# Patient Record
Sex: FEMALE | Race: WHITE | Employment: OTHER | URBAN - METROPOLITAN AREA
[De-identification: names, ages, dates, MRNs, and addresses within clinical notes are randomized per-mention and may not be internally consistent; named-entity substitution may affect disease eponyms.]

---

## 2021-02-08 RX ORDER — SOTALOL HYDROCHLORIDE 160 MG/1
TABLET ORAL
COMMUNITY

## 2021-02-08 RX ORDER — DILTIAZEM HYDROCHLORIDE 120 MG/1
120 CAPSULE, EXTENDED RELEASE ORAL DAILY
COMMUNITY

## 2021-02-23 ENCOUNTER — HOSPITAL ENCOUNTER (EMERGENCY)
Age: 86
Discharge: STILL ADMITTED OR ADMINISTRATIVE | End: 2021-02-23

## 2021-02-23 ENCOUNTER — HOSPITAL ENCOUNTER (OUTPATIENT)
Age: 86
Discharge: ANOTHER HOSPITAL | End: 2021-02-23
Attending: INTERNAL MEDICINE | Admitting: INTERNAL MEDICINE
Payer: MEDICARE

## 2021-02-23 VITALS
HEART RATE: 71 BPM | BODY MASS INDEX: 19.63 KG/M2 | DIASTOLIC BLOOD PRESSURE: 72 MMHG | OXYGEN SATURATION: 100 % | SYSTOLIC BLOOD PRESSURE: 111 MMHG | HEIGHT: 64 IN | WEIGHT: 115 LBS | TEMPERATURE: 97.9 F | RESPIRATION RATE: 19 BRPM

## 2021-02-23 DIAGNOSIS — R07.9 CHEST PAIN, UNSPECIFIED TYPE: ICD-10-CM

## 2021-02-23 DIAGNOSIS — R06.02 SOB (SHORTNESS OF BREATH): ICD-10-CM

## 2021-02-23 LAB
ALBUMIN SERPL-MCNC: 3.1 G/DL (ref 3.5–5)
ALBUMIN/GLOB SERPL: 0.7 {RATIO} (ref 1.1–2.2)
ALP SERPL-CCNC: 108 U/L (ref 45–117)
ALT SERPL-CCNC: 14 U/L (ref 12–78)
ANION GAP SERPL CALC-SCNC: 5 MMOL/L (ref 5–15)
APTT PPP: 27.8 SEC (ref 23–35.7)
AST SERPL W P-5'-P-CCNC: 20 U/L (ref 15–37)
ATRIAL RATE: 54 BPM
BILIRUB SERPL-MCNC: 0.5 MG/DL (ref 0.2–1)
BUN SERPL-MCNC: 17 MG/DL (ref 6–20)
BUN/CREAT SERPL: 17 (ref 12–20)
CA-I BLD-MCNC: 9.6 MG/DL (ref 8.5–10.1)
CALCULATED P AXIS, ECG09: 84 DEGREES
CALCULATED R AXIS, ECG10: -52 DEGREES
CALCULATED T AXIS, ECG11: 62 DEGREES
CHLORIDE SERPL-SCNC: 98 MMOL/L (ref 97–108)
CO2 SERPL-SCNC: 33 MMOL/L (ref 21–32)
CREAT SERPL-MCNC: 0.98 MG/DL (ref 0.55–1.02)
DIAGNOSIS, 93000: NORMAL
ERYTHROCYTE [DISTWIDTH] IN BLOOD BY AUTOMATED COUNT: 14.8 % (ref 11.5–14.5)
GLOBULIN SER CALC-MCNC: 4.5 G/DL (ref 2–4)
GLUCOSE BLD STRIP.AUTO-MCNC: 104 MG/DL (ref 65–100)
GLUCOSE SERPL-MCNC: 95 MG/DL (ref 65–100)
HCT VFR BLD AUTO: 43.6 % (ref 35–47)
HGB BLD-MCNC: 13.4 G/DL (ref 11.5–16)
INR PPP: 1 (ref 0.9–1.1)
MCH RBC QN AUTO: 28.3 PG (ref 26–34)
MCHC RBC AUTO-ENTMCNC: 30.7 G/DL (ref 30–36.5)
MCV RBC AUTO: 92.2 FL (ref 80–99)
P-R INTERVAL, ECG05: 194 MS
PERFORMED BY, TECHID: ABNORMAL
PLATELET # BLD AUTO: 296 K/UL (ref 150–400)
PMV BLD AUTO: 10.1 FL (ref 8.9–12.9)
POTASSIUM SERPL-SCNC: 4 MMOL/L (ref 3.5–5.1)
PROT SERPL-MCNC: 7.6 G/DL (ref 6.4–8.2)
PROTHROMBIN TIME: 13.5 SEC (ref 11.9–14.7)
Q-T INTERVAL, ECG07: 482 MS
QRS DURATION, ECG06: 76 MS
QTC CALCULATION (BEZET), ECG08: 457 MS
RBC # BLD AUTO: 4.73 M/UL (ref 3.8–5.2)
SODIUM SERPL-SCNC: 136 MMOL/L (ref 136–145)
THERAPEUTIC RANGE,PTTT: NORMAL SEC (ref 68–109)
VENTRICULAR RATE, ECG03: 54 BPM
WBC # BLD AUTO: 11 K/UL (ref 3.6–11)

## 2021-02-23 PROCEDURE — 77030019698 HC SYR ANGI MDLON MRTM -A: Performed by: INTERNAL MEDICINE

## 2021-02-23 PROCEDURE — 93571 IV DOP VEL&/PRESS C FLO 1ST: CPT | Performed by: INTERNAL MEDICINE

## 2021-02-23 PROCEDURE — 93010 ELECTROCARDIOGRAM REPORT: CPT | Performed by: INTERNAL MEDICINE

## 2021-02-23 PROCEDURE — 76210000005 HC OR PH I REC 5 TO 5.5 HR: Performed by: INTERNAL MEDICINE

## 2021-02-23 PROCEDURE — 2709999900 HC NON-CHARGEABLE SUPPLY: Performed by: INTERNAL MEDICINE

## 2021-02-23 PROCEDURE — 74011000250 HC RX REV CODE- 250: Performed by: INTERNAL MEDICINE

## 2021-02-23 PROCEDURE — 74011250636 HC RX REV CODE- 250/636: Performed by: INTERNAL MEDICINE

## 2021-02-23 PROCEDURE — C1894 INTRO/SHEATH, NON-LASER: HCPCS | Performed by: INTERNAL MEDICINE

## 2021-02-23 PROCEDURE — C1769 GUIDE WIRE: HCPCS | Performed by: INTERNAL MEDICINE

## 2021-02-23 PROCEDURE — 99152 MOD SED SAME PHYS/QHP 5/>YRS: CPT | Performed by: INTERNAL MEDICINE

## 2021-02-23 PROCEDURE — 74011250637 HC RX REV CODE- 250/637: Performed by: INTERNAL MEDICINE

## 2021-02-23 PROCEDURE — C1887 CATHETER, GUIDING: HCPCS | Performed by: INTERNAL MEDICINE

## 2021-02-23 PROCEDURE — 82962 GLUCOSE BLOOD TEST: CPT

## 2021-02-23 PROCEDURE — 85610 PROTHROMBIN TIME: CPT

## 2021-02-23 PROCEDURE — 93458 L HRT ARTERY/VENTRICLE ANGIO: CPT | Performed by: INTERNAL MEDICINE

## 2021-02-23 PROCEDURE — 85730 THROMBOPLASTIN TIME PARTIAL: CPT

## 2021-02-23 PROCEDURE — 92978 ENDOLUMINL IVUS OCT C 1ST: CPT | Performed by: INTERNAL MEDICINE

## 2021-02-23 PROCEDURE — 80053 COMPREHEN METABOLIC PANEL: CPT

## 2021-02-23 PROCEDURE — 74011000636 HC RX REV CODE- 636: Performed by: INTERNAL MEDICINE

## 2021-02-23 PROCEDURE — 77030008542 HC TBNG MON PRSS EDWD -A: Performed by: INTERNAL MEDICINE

## 2021-02-23 PROCEDURE — 85027 COMPLETE CBC AUTOMATED: CPT

## 2021-02-23 PROCEDURE — 77030016699 HC CATH ANGI DX INFN1 CARD -A: Performed by: INTERNAL MEDICINE

## 2021-02-23 PROCEDURE — 77030003394 HC NDL ART COOK -A: Performed by: INTERNAL MEDICINE

## 2021-02-23 PROCEDURE — 77030012468 HC VLV BLEEDBK CNTRL ABBT -B: Performed by: INTERNAL MEDICINE

## 2021-02-23 PROCEDURE — 36415 COLL VENOUS BLD VENIPUNCTURE: CPT

## 2021-02-23 PROCEDURE — C1753 CATH, INTRAVAS ULTRASOUND: HCPCS | Performed by: INTERNAL MEDICINE

## 2021-02-23 PROCEDURE — 77030029065 HC DRSG HEMO QCLOT ZMED -B: Performed by: INTERNAL MEDICINE

## 2021-02-23 PROCEDURE — 77030042317 HC BND COMPR HEMSTAT -B: Performed by: INTERNAL MEDICINE

## 2021-02-23 PROCEDURE — 99153 MOD SED SAME PHYS/QHP EA: CPT | Performed by: INTERNAL MEDICINE

## 2021-02-23 PROCEDURE — 93005 ELECTROCARDIOGRAM TRACING: CPT

## 2021-02-23 RX ORDER — FENTANYL CITRATE 50 UG/ML
INJECTION, SOLUTION INTRAMUSCULAR; INTRAVENOUS AS NEEDED
Status: DISCONTINUED | OUTPATIENT
Start: 2021-02-23 | End: 2021-02-23 | Stop reason: HOSPADM

## 2021-02-23 RX ORDER — MIDAZOLAM HYDROCHLORIDE 1 MG/ML
INJECTION, SOLUTION INTRAMUSCULAR; INTRAVENOUS AS NEEDED
Status: DISCONTINUED | OUTPATIENT
Start: 2021-02-23 | End: 2021-02-23 | Stop reason: HOSPADM

## 2021-02-23 RX ORDER — HEPARIN SODIUM 1000 [USP'U]/ML
INJECTION, SOLUTION INTRAVENOUS; SUBCUTANEOUS AS NEEDED
Status: DISCONTINUED | OUTPATIENT
Start: 2021-02-23 | End: 2021-02-23 | Stop reason: HOSPADM

## 2021-02-23 RX ORDER — HEPARIN SODIUM 200 [USP'U]/100ML
INJECTION, SOLUTION INTRAVENOUS
Status: COMPLETED | OUTPATIENT
Start: 2021-02-23 | End: 2021-02-23

## 2021-02-23 RX ORDER — SODIUM CHLORIDE 0.9 % (FLUSH) 0.9 %
5-40 SYRINGE (ML) INJECTION AS NEEDED
Status: CANCELLED | OUTPATIENT
Start: 2021-02-23

## 2021-02-23 RX ORDER — NICARDIPINE HYDROCHLORIDE 2.5 MG/ML
INJECTION INTRAVENOUS AS NEEDED
Status: DISCONTINUED | OUTPATIENT
Start: 2021-02-23 | End: 2021-02-23 | Stop reason: HOSPADM

## 2021-02-23 RX ORDER — LIDOCAINE HYDROCHLORIDE 10 MG/ML
INJECTION INFILTRATION; PERINEURAL AS NEEDED
Status: DISCONTINUED | OUTPATIENT
Start: 2021-02-23 | End: 2021-02-23 | Stop reason: HOSPADM

## 2021-02-23 RX ORDER — GUAIFENESIN 100 MG/5ML
LIQUID (ML) ORAL AS NEEDED
Status: DISCONTINUED | OUTPATIENT
Start: 2021-02-23 | End: 2021-02-23 | Stop reason: HOSPADM

## 2021-02-23 RX ORDER — SODIUM CHLORIDE 9 MG/ML
50 INJECTION, SOLUTION INTRAVENOUS CONTINUOUS
Status: DISCONTINUED | OUTPATIENT
Start: 2021-02-23 | End: 2021-02-24 | Stop reason: HOSPADM

## 2021-02-23 RX ORDER — SODIUM CHLORIDE 0.9 % (FLUSH) 0.9 %
5-40 SYRINGE (ML) INJECTION EVERY 8 HOURS
Status: CANCELLED | OUTPATIENT
Start: 2021-02-23

## 2021-02-23 RX ORDER — GUAIFENESIN 100 MG/5ML
81 LIQUID (ML) ORAL DAILY
Status: DISCONTINUED | OUTPATIENT
Start: 2021-02-24 | End: 2021-02-24 | Stop reason: HOSPADM

## 2021-02-23 RX ADMIN — SODIUM CHLORIDE 50 ML/HR: 9 INJECTION, SOLUTION INTRAVENOUS at 13:59

## 2021-02-23 NOTE — Clinical Note
TRANSFER - OUT REPORT:     Verbal report given to: Joseph. Report consisted of patient's Situation, Background, Assessment and   Recommendations(SBAR). Opportunity for questions and clarification was provided. Patient transported with a Registered Nurse. Patient transported to: PACU.

## 2021-02-24 NOTE — PROGRESS NOTES
Patient transferred from PACU to [facility] bed 5. Report was given to HILLARY Massey on unit. Patient belongings sent with EMS/Patient. Patients daughter took purse prior to going home. EMS was given report on patient prior to transfer. Patient off the unit at 2127.

## 2021-10-31 ENCOUNTER — APPOINTMENT (OUTPATIENT)
Dept: GENERAL RADIOLOGY | Age: 86
DRG: 291 | End: 2021-10-31
Attending: STUDENT IN AN ORGANIZED HEALTH CARE EDUCATION/TRAINING PROGRAM
Payer: MEDICARE

## 2021-10-31 ENCOUNTER — HOSPITAL ENCOUNTER (INPATIENT)
Age: 86
LOS: 2 days | Discharge: OTHER INSTITUTION | DRG: 291 | End: 2021-11-02
Attending: EMERGENCY MEDICINE | Admitting: FAMILY MEDICINE
Payer: MEDICARE

## 2021-10-31 DIAGNOSIS — I50.9 CONGESTIVE HEART FAILURE, UNSPECIFIED HF CHRONICITY, UNSPECIFIED HEART FAILURE TYPE (HCC): Primary | ICD-10-CM

## 2021-10-31 PROBLEM — J44.9 COPD (CHRONIC OBSTRUCTIVE PULMONARY DISEASE) (HCC): Status: ACTIVE | Noted: 2021-10-31

## 2021-10-31 LAB
ALBUMIN SERPL-MCNC: 2.6 G/DL (ref 3.5–5)
ALBUMIN/GLOB SERPL: 0.7 {RATIO} (ref 1.1–2.2)
ALP SERPL-CCNC: 107 U/L (ref 45–117)
ALT SERPL-CCNC: 52 U/L (ref 12–78)
ANION GAP SERPL CALC-SCNC: 6 MMOL/L (ref 5–15)
AST SERPL W P-5'-P-CCNC: 39 U/L (ref 15–37)
BASOPHILS # BLD: 0 K/UL (ref 0–0.1)
BASOPHILS NFR BLD: 0 % (ref 0–1)
BILIRUB SERPL-MCNC: 0.4 MG/DL (ref 0.2–1)
BUN SERPL-MCNC: 22 MG/DL (ref 6–20)
BUN/CREAT SERPL: 22 (ref 12–20)
CA-I BLD-MCNC: 8.6 MG/DL (ref 8.5–10.1)
CHLORIDE SERPL-SCNC: 86 MMOL/L (ref 97–108)
CK SERPL-CCNC: 37 NG/ML (ref 26–192)
CO2 SERPL-SCNC: 36 MMOL/L (ref 21–32)
COVID-19 RAPID TEST, COVR: NOT DETECTED
CREAT SERPL-MCNC: 0.98 MG/DL (ref 0.55–1.02)
DIFFERENTIAL METHOD BLD: ABNORMAL
EOSINOPHIL # BLD: 0.2 K/UL (ref 0–0.4)
EOSINOPHIL NFR BLD: 2 % (ref 0–7)
ERYTHROCYTE [DISTWIDTH] IN BLOOD BY AUTOMATED COUNT: 16.1 % (ref 11.5–14.5)
GLOBULIN SER CALC-MCNC: 3.5 G/DL (ref 2–4)
GLUCOSE SERPL-MCNC: 146 MG/DL (ref 65–100)
HCT VFR BLD AUTO: 35.1 % (ref 35–47)
HGB BLD-MCNC: 10.5 G/DL (ref 11.5–16)
IMM GRANULOCYTES # BLD AUTO: 0.1 K/UL (ref 0–0.04)
IMM GRANULOCYTES NFR BLD AUTO: 0 % (ref 0–0.5)
LYMPHOCYTES # BLD: 1.3 K/UL (ref 0.8–3.5)
LYMPHOCYTES NFR BLD: 12 % (ref 12–49)
MCH RBC QN AUTO: 27.9 PG (ref 26–34)
MCHC RBC AUTO-ENTMCNC: 29.9 G/DL (ref 30–36.5)
MCV RBC AUTO: 93.1 FL (ref 80–99)
MONOCYTES # BLD: 1.1 K/UL (ref 0–1)
MONOCYTES NFR BLD: 9 % (ref 5–13)
NEUTS SEG # BLD: 8.9 K/UL (ref 1.8–8)
NEUTS SEG NFR BLD: 77 % (ref 32–75)
NRBC # BLD: 0 K/UL (ref 0–0.01)
NRBC BLD-RTO: 0 PER 100 WBC
PLATELET # BLD AUTO: 375 K/UL (ref 150–400)
PMV BLD AUTO: 9.7 FL (ref 8.9–12.9)
POTASSIUM SERPL-SCNC: 5.5 MMOL/L (ref 3.5–5.1)
PROT SERPL-MCNC: 6.1 G/DL (ref 6.4–8.2)
RBC # BLD AUTO: 3.77 M/UL (ref 3.8–5.2)
SARS-COV-2, COV2: NORMAL
SODIUM SERPL-SCNC: 128 MMOL/L (ref 136–145)
SPECIMEN SOURCE: NORMAL
TROPONIN-HIGH SENSITIVITY: 61 NG/L (ref 0–51)
TROPONIN-HIGH SENSITIVITY: 63 NG/L (ref 0–51)
WBC # BLD AUTO: 11.6 K/UL (ref 3.6–11)

## 2021-10-31 PROCEDURE — 87635 SARS-COV-2 COVID-19 AMP PRB: CPT

## 2021-10-31 PROCEDURE — 93005 ELECTROCARDIOGRAM TRACING: CPT

## 2021-10-31 PROCEDURE — 82550 ASSAY OF CK (CPK): CPT

## 2021-10-31 PROCEDURE — 74011250636 HC RX REV CODE- 250/636: Performed by: EMERGENCY MEDICINE

## 2021-10-31 PROCEDURE — 94640 AIRWAY INHALATION TREATMENT: CPT

## 2021-10-31 PROCEDURE — 84484 ASSAY OF TROPONIN QUANT: CPT

## 2021-10-31 PROCEDURE — 85025 COMPLETE CBC W/AUTO DIFF WBC: CPT

## 2021-10-31 PROCEDURE — 80053 COMPREHEN METABOLIC PANEL: CPT

## 2021-10-31 PROCEDURE — 74011000250 HC RX REV CODE- 250: Performed by: EMERGENCY MEDICINE

## 2021-10-31 PROCEDURE — 65270000029 HC RM PRIVATE

## 2021-10-31 PROCEDURE — 36415 COLL VENOUS BLD VENIPUNCTURE: CPT

## 2021-10-31 PROCEDURE — 96374 THER/PROPH/DIAG INJ IV PUSH: CPT

## 2021-10-31 PROCEDURE — 99284 EMERGENCY DEPT VISIT MOD MDM: CPT

## 2021-10-31 PROCEDURE — 71045 X-RAY EXAM CHEST 1 VIEW: CPT

## 2021-10-31 RX ORDER — IPRATROPIUM BROMIDE AND ALBUTEROL SULFATE 2.5; .5 MG/3ML; MG/3ML
3 SOLUTION RESPIRATORY (INHALATION)
Status: COMPLETED | OUTPATIENT
Start: 2021-10-31 | End: 2021-10-31

## 2021-10-31 RX ORDER — FUROSEMIDE 10 MG/ML
20 INJECTION INTRAMUSCULAR; INTRAVENOUS
Status: COMPLETED | OUTPATIENT
Start: 2021-10-31 | End: 2021-10-31

## 2021-10-31 RX ADMIN — FUROSEMIDE 20 MG: 10 INJECTION, SOLUTION INTRAMUSCULAR; INTRAVENOUS at 23:01

## 2021-10-31 RX ADMIN — IPRATROPIUM BROMIDE AND ALBUTEROL SULFATE 3 ML: .5; 2.5 SOLUTION RESPIRATORY (INHALATION) at 21:40

## 2021-11-01 VITALS
SYSTOLIC BLOOD PRESSURE: 119 MMHG | TEMPERATURE: 98 F | HEIGHT: 64 IN | DIASTOLIC BLOOD PRESSURE: 72 MMHG | OXYGEN SATURATION: 100 % | WEIGHT: 111.33 LBS | HEART RATE: 108 BPM | BODY MASS INDEX: 19.01 KG/M2 | RESPIRATION RATE: 20 BRPM

## 2021-11-01 PROBLEM — I50.9 CHF (CONGESTIVE HEART FAILURE) (HCC): Status: ACTIVE | Noted: 2021-11-01

## 2021-11-01 LAB
ANION GAP SERPL CALC-SCNC: 2 MMOL/L (ref 5–15)
ANION GAP SERPL CALC-SCNC: 4 MMOL/L (ref 5–15)
APPEARANCE UR: CLEAR
ARTERIAL PATENCY WRIST A: ABNORMAL
ATRIAL RATE: 180 BPM
BACTERIA URNS QL MICRO: NEGATIVE /HPF
BASE EXCESS BLDA CALC-SCNC: 13.6 MMOL/L (ref 0–2)
BASOPHILS # BLD: 0 K/UL (ref 0–0.1)
BASOPHILS NFR BLD: 0 % (ref 0–1)
BDY SITE: ABNORMAL
BILIRUB UR QL: NEGATIVE
BNP SERPL-MCNC: ABNORMAL PG/ML
BNP SERPL-MCNC: ABNORMAL PG/ML
BUN SERPL-MCNC: 21 MG/DL (ref 6–20)
BUN SERPL-MCNC: 24 MG/DL (ref 6–20)
BUN/CREAT SERPL: 25 (ref 12–20)
BUN/CREAT SERPL: 27 (ref 12–20)
CA-I BLD-MCNC: 8.6 MG/DL (ref 8.5–10.1)
CA-I BLD-MCNC: 8.9 MG/DL (ref 8.5–10.1)
CALCULATED R AXIS, ECG10: -53 DEGREES
CALCULATED T AXIS, ECG11: 83 DEGREES
CHLORIDE SERPL-SCNC: 85 MMOL/L (ref 97–108)
CHLORIDE SERPL-SCNC: 85 MMOL/L (ref 97–108)
CO2 SERPL-SCNC: 39 MMOL/L (ref 21–32)
CO2 SERPL-SCNC: 40 MMOL/L (ref 21–32)
COLOR UR: NORMAL
CREAT SERPL-MCNC: 0.83 MG/DL (ref 0.55–1.02)
CREAT SERPL-MCNC: 0.9 MG/DL (ref 0.55–1.02)
DIAGNOSIS, 93000: NORMAL
DIFFERENTIAL METHOD BLD: ABNORMAL
EOSINOPHIL # BLD: 0.2 K/UL (ref 0–0.4)
EOSINOPHIL NFR BLD: 2 % (ref 0–7)
ERYTHROCYTE [DISTWIDTH] IN BLOOD BY AUTOMATED COUNT: 16 % (ref 11.5–14.5)
EST. AVERAGE GLUCOSE BLD GHB EST-MCNC: 143 MG/DL
GAS FLOW.O2 O2 DELIVERY SYS: 3 L/MIN
GLUCOSE BLD STRIP.AUTO-MCNC: 115 MG/DL (ref 65–117)
GLUCOSE BLD STRIP.AUTO-MCNC: 136 MG/DL (ref 65–117)
GLUCOSE BLD STRIP.AUTO-MCNC: 161 MG/DL (ref 65–117)
GLUCOSE SERPL-MCNC: 112 MG/DL (ref 65–100)
GLUCOSE SERPL-MCNC: 145 MG/DL (ref 65–100)
GLUCOSE UR STRIP.AUTO-MCNC: NEGATIVE MG/DL
HBA1C MFR BLD: 6.6 % (ref 4–5.6)
HCO3 BLDA-SCNC: 37 MMOL/L (ref 22–26)
HCT VFR BLD AUTO: 33.5 % (ref 35–47)
HGB BLD-MCNC: 10.2 G/DL (ref 11.5–16)
HGB UR QL STRIP: NEGATIVE
HYALINE CASTS URNS QL MICRO: NORMAL /LPF (ref 0–5)
IMM GRANULOCYTES # BLD AUTO: 0.1 K/UL (ref 0–0.04)
IMM GRANULOCYTES NFR BLD AUTO: 0 % (ref 0–0.5)
KETONES UR QL STRIP.AUTO: NEGATIVE MG/DL
LEUKOCYTE ESTERASE UR QL STRIP.AUTO: NEGATIVE
LYMPHOCYTES # BLD: 1.2 K/UL (ref 0.8–3.5)
LYMPHOCYTES NFR BLD: 11 % (ref 12–49)
MCH RBC QN AUTO: 28 PG (ref 26–34)
MCHC RBC AUTO-ENTMCNC: 30.4 G/DL (ref 30–36.5)
MCV RBC AUTO: 92 FL (ref 80–99)
MONOCYTES # BLD: 1.1 K/UL (ref 0–1)
MONOCYTES NFR BLD: 9 % (ref 5–13)
MUCOUS THREADS URNS QL MICRO: NEGATIVE /LPF
NEUTS SEG # BLD: 9 K/UL (ref 1.8–8)
NEUTS SEG NFR BLD: 78 % (ref 32–75)
NITRITE UR QL STRIP.AUTO: NEGATIVE
NRBC # BLD: 0 K/UL (ref 0–0.01)
NRBC BLD-RTO: 0 PER 100 WBC
PCO2 BLDA: 71 MMHG (ref 35–45)
PERFORMED BY, TECHID: ABNORMAL
PERFORMED BY, TECHID: ABNORMAL
PERFORMED BY, TECHID: NORMAL
PH BLDA: 7.38 [PH] (ref 7.35–7.45)
PH UR STRIP: 5 [PH] (ref 5–8)
PLATELET # BLD AUTO: 345 K/UL (ref 150–400)
PMV BLD AUTO: 9.7 FL (ref 8.9–12.9)
PO2 BLDA: 72 MMHG (ref 75–100)
POTASSIUM SERPL-SCNC: 4.9 MMOL/L (ref 3.5–5.1)
POTASSIUM SERPL-SCNC: 4.9 MMOL/L (ref 3.5–5.1)
PROT UR STRIP-MCNC: NEGATIVE MG/DL
Q-T INTERVAL, ECG07: 438 MS
QRS DURATION, ECG06: 70 MS
QTC CALCULATION (BEZET), ECG08: 387 MS
RBC # BLD AUTO: 3.64 M/UL (ref 3.8–5.2)
RBC #/AREA URNS HPF: NORMAL /HPF (ref 0–5)
SAO2 % BLD: 96 %
SAO2% DEVICE SAO2% SENSOR NAME: ABNORMAL
SERVICE CMNT-IMP: ABNORMAL
SODIUM SERPL-SCNC: 127 MMOL/L (ref 136–145)
SODIUM SERPL-SCNC: 128 MMOL/L (ref 136–145)
SP GR UR REFRACTOMETRY: <1.005 (ref 1–1.03)
TROPONIN-HIGH SENSITIVITY: 46 NG/L (ref 0–51)
TROPONIN-HIGH SENSITIVITY: 47 NG/L (ref 0–51)
UROBILINOGEN UR QL STRIP.AUTO: 0.1 EU/DL (ref 0.1–1)
VENTRICULAR RATE, ECG03: 47 BPM
WBC # BLD AUTO: 11.5 K/UL (ref 3.6–11)
WBC URNS QL MICRO: NORMAL /HPF (ref 0–4)

## 2021-11-01 PROCEDURE — 36415 COLL VENOUS BLD VENIPUNCTURE: CPT

## 2021-11-01 PROCEDURE — 74011000250 HC RX REV CODE- 250: Performed by: FAMILY MEDICINE

## 2021-11-01 PROCEDURE — 80048 BASIC METABOLIC PNL TOTAL CA: CPT

## 2021-11-01 PROCEDURE — 36600 WITHDRAWAL OF ARTERIAL BLOOD: CPT

## 2021-11-01 PROCEDURE — 82962 GLUCOSE BLOOD TEST: CPT

## 2021-11-01 PROCEDURE — 74011636637 HC RX REV CODE- 636/637: Performed by: FAMILY MEDICINE

## 2021-11-01 PROCEDURE — 94640 AIRWAY INHALATION TREATMENT: CPT

## 2021-11-01 PROCEDURE — 87040 BLOOD CULTURE FOR BACTERIA: CPT

## 2021-11-01 PROCEDURE — 81001 URINALYSIS AUTO W/SCOPE: CPT

## 2021-11-01 PROCEDURE — 85025 COMPLETE CBC W/AUTO DIFF WBC: CPT

## 2021-11-01 PROCEDURE — 74011250637 HC RX REV CODE- 250/637: Performed by: FAMILY MEDICINE

## 2021-11-01 PROCEDURE — 74011250636 HC RX REV CODE- 250/636: Performed by: FAMILY MEDICINE

## 2021-11-01 PROCEDURE — 83036 HEMOGLOBIN GLYCOSYLATED A1C: CPT

## 2021-11-01 PROCEDURE — 74011250636 HC RX REV CODE- 250/636: Performed by: INTERNAL MEDICINE

## 2021-11-01 PROCEDURE — 84484 ASSAY OF TROPONIN QUANT: CPT

## 2021-11-01 PROCEDURE — 65270000029 HC RM PRIVATE

## 2021-11-01 PROCEDURE — 83880 ASSAY OF NATRIURETIC PEPTIDE: CPT

## 2021-11-01 PROCEDURE — 77010033678 HC OXYGEN DAILY

## 2021-11-01 PROCEDURE — 82803 BLOOD GASES ANY COMBINATION: CPT

## 2021-11-01 RX ORDER — IPRATROPIUM BROMIDE AND ALBUTEROL SULFATE 2.5; .5 MG/3ML; MG/3ML
3 SOLUTION RESPIRATORY (INHALATION)
Status: DISCONTINUED | OUTPATIENT
Start: 2021-11-01 | End: 2021-11-02 | Stop reason: HOSPADM

## 2021-11-01 RX ORDER — DEXTROSE 50 % IN WATER (D50W) INTRAVENOUS SYRINGE
25-50 AS NEEDED
Status: DISCONTINUED | OUTPATIENT
Start: 2021-11-01 | End: 2021-11-02 | Stop reason: HOSPADM

## 2021-11-01 RX ORDER — HEPARIN SODIUM 1000 [USP'U]/ML
30 INJECTION, SOLUTION INTRAVENOUS; SUBCUTANEOUS AS NEEDED
Status: DISCONTINUED | OUTPATIENT
Start: 2021-11-01 | End: 2021-11-02 | Stop reason: HOSPADM

## 2021-11-01 RX ORDER — ATORVASTATIN CALCIUM 40 MG/1
40 TABLET, FILM COATED ORAL
Status: DISCONTINUED | OUTPATIENT
Start: 2021-11-01 | End: 2021-11-02 | Stop reason: HOSPADM

## 2021-11-01 RX ORDER — DILTIAZEM HYDROCHLORIDE 180 MG/1
180 CAPSULE, EXTENDED RELEASE ORAL DAILY
Status: DISCONTINUED | OUTPATIENT
Start: 2021-11-02 | End: 2021-11-02 | Stop reason: HOSPADM

## 2021-11-01 RX ORDER — HEPARIN SODIUM 1000 [USP'U]/ML
60 INJECTION, SOLUTION INTRAVENOUS; SUBCUTANEOUS AS NEEDED
Status: DISCONTINUED | OUTPATIENT
Start: 2021-11-01 | End: 2021-11-02 | Stop reason: HOSPADM

## 2021-11-01 RX ORDER — ENOXAPARIN SODIUM 100 MG/ML
40 INJECTION SUBCUTANEOUS DAILY
Status: DISCONTINUED | OUTPATIENT
Start: 2021-11-01 | End: 2021-11-01

## 2021-11-01 RX ORDER — ACETAMINOPHEN 325 MG/1
650 TABLET ORAL
Status: DISCONTINUED | OUTPATIENT
Start: 2021-11-01 | End: 2021-11-02 | Stop reason: HOSPADM

## 2021-11-01 RX ORDER — BUDESONIDE AND FORMOTEROL FUMARATE DIHYDRATE 160; 4.5 UG/1; UG/1
2 AEROSOL RESPIRATORY (INHALATION) 2 TIMES DAILY
Status: DISCONTINUED | OUTPATIENT
Start: 2021-11-01 | End: 2021-11-02 | Stop reason: HOSPADM

## 2021-11-01 RX ORDER — SOTALOL HYDROCHLORIDE 80 MG/1
160 TABLET ORAL EVERY 12 HOURS
Status: DISCONTINUED | OUTPATIENT
Start: 2021-11-01 | End: 2021-11-02 | Stop reason: HOSPADM

## 2021-11-01 RX ORDER — POLYETHYLENE GLYCOL 3350 17 G/17G
17 POWDER, FOR SOLUTION ORAL DAILY PRN
Status: DISCONTINUED | OUTPATIENT
Start: 2021-11-01 | End: 2021-11-02 | Stop reason: HOSPADM

## 2021-11-01 RX ORDER — HEPARIN SODIUM 10000 [USP'U]/100ML
12-25 INJECTION, SOLUTION INTRAVENOUS
Status: DISCONTINUED | OUTPATIENT
Start: 2021-11-01 | End: 2021-11-02 | Stop reason: HOSPADM

## 2021-11-01 RX ORDER — ASPIRIN 325 MG
325 TABLET ORAL DAILY
Status: DISCONTINUED | OUTPATIENT
Start: 2021-11-01 | End: 2021-11-02 | Stop reason: HOSPADM

## 2021-11-01 RX ORDER — INSULIN LISPRO 100 [IU]/ML
INJECTION, SOLUTION INTRAVENOUS; SUBCUTANEOUS
Status: DISCONTINUED | OUTPATIENT
Start: 2021-11-01 | End: 2021-11-02 | Stop reason: HOSPADM

## 2021-11-01 RX ORDER — ONDANSETRON 2 MG/ML
4 INJECTION INTRAMUSCULAR; INTRAVENOUS
Status: DISCONTINUED | OUTPATIENT
Start: 2021-11-01 | End: 2021-11-02 | Stop reason: HOSPADM

## 2021-11-01 RX ORDER — ONDANSETRON 4 MG/1
4 TABLET, ORALLY DISINTEGRATING ORAL
Status: DISCONTINUED | OUTPATIENT
Start: 2021-11-01 | End: 2021-11-02 | Stop reason: HOSPADM

## 2021-11-01 RX ORDER — ACETAMINOPHEN 650 MG/1
650 SUPPOSITORY RECTAL
Status: DISCONTINUED | OUTPATIENT
Start: 2021-11-01 | End: 2021-11-02 | Stop reason: HOSPADM

## 2021-11-01 RX ORDER — MAGNESIUM SULFATE 100 %
4 CRYSTALS MISCELLANEOUS AS NEEDED
Status: DISCONTINUED | OUTPATIENT
Start: 2021-11-01 | End: 2021-11-02 | Stop reason: HOSPADM

## 2021-11-01 RX ORDER — FUROSEMIDE 10 MG/ML
40 INJECTION INTRAMUSCULAR; INTRAVENOUS 3 TIMES DAILY
Status: DISCONTINUED | OUTPATIENT
Start: 2021-11-01 | End: 2021-11-02 | Stop reason: HOSPADM

## 2021-11-01 RX ADMIN — FUROSEMIDE 40 MG: 10 INJECTION, SOLUTION INTRAMUSCULAR; INTRAVENOUS at 15:36

## 2021-11-01 RX ADMIN — SOTALOL HYDROCHLORIDE 160 MG: 80 TABLET ORAL at 11:52

## 2021-11-01 RX ADMIN — BUDESONIDE AND FORMOTEROL FUMARATE DIHYDRATE 2 PUFF: 160; 4.5 AEROSOL RESPIRATORY (INHALATION) at 13:43

## 2021-11-01 RX ADMIN — HEPARIN SODIUM AND DEXTROSE 12 UNITS/KG/HR: 10000; 5 INJECTION INTRAVENOUS at 17:23

## 2021-11-01 RX ADMIN — IPRATROPIUM BROMIDE AND ALBUTEROL SULFATE 3 ML: .5; 2.5 SOLUTION RESPIRATORY (INHALATION) at 13:43

## 2021-11-01 RX ADMIN — ONDANSETRON 4 MG: 2 INJECTION INTRAMUSCULAR; INTRAVENOUS at 13:11

## 2021-11-01 RX ADMIN — INSULIN LISPRO 3 UNITS: 100 INJECTION, SOLUTION INTRAVENOUS; SUBCUTANEOUS at 16:30

## 2021-11-01 RX ADMIN — ASPIRIN 325 MG ORAL TABLET 325 MG: 325 PILL ORAL at 11:56

## 2021-11-01 RX ADMIN — CEFTRIAXONE 1 G: 1 INJECTION, POWDER, FOR SOLUTION INTRAMUSCULAR; INTRAVENOUS at 11:52

## 2021-11-01 RX ADMIN — METHYLPREDNISOLONE SODIUM SUCCINATE 40 MG: 40 INJECTION, POWDER, FOR SOLUTION INTRAMUSCULAR; INTRAVENOUS at 11:52

## 2021-11-01 RX ADMIN — ENOXAPARIN SODIUM 40 MG: 40 INJECTION SUBCUTANEOUS at 11:56

## 2021-11-01 RX ADMIN — METHYLPREDNISOLONE SODIUM SUCCINATE 40 MG: 40 INJECTION, POWDER, FOR SOLUTION INTRAMUSCULAR; INTRAVENOUS at 17:29

## 2021-11-01 NOTE — PROGRESS NOTES
Comprehensive Nutrition Assessment    Type and Reason for Visit: Initial (Low BMI)    Nutrition Recommendations/Plan:   Adjust to Easy to [employer]    Add ensure HP 3x daily  Add magic cup daily    Consider addition of appetite stimulant    Document % intakes and BM in I/O's    Nutrition Assessment:  Admitted for SOB. Reports difficulty eating 2/2 fatigue. Ate some B this morning. But not much, 2/2 decreased appetite x2 months. Just admitted to floor. Has not yet had L. Requesting Ensure. RD provided pt with ensure enlive to help with intakes before B. States \"teeth are bad\"- agreeable to easy to chew diet. Labs: H/H 10.2/33.5, Na 128, CO2 39, Bun 21, -136, AST 39. Meds: furosemide, rocephin, zofran. Malnutrition Assessment:  Malnutrition Status:  Severe malnutrition    Context:  Acute illness     Findings of the 6 clinical characteristics of malnutrition:   Energy Intake:  7 - 50% or less of est energy requirements for 5 or more days  Weight Loss:  7.00 - Greater than 7.5% over 3 months (-14% in 3 months)     Body Fat Loss:  1 - Mild body fat loss, Orbital   Muscle Mass Loss:  1 - Mild muscle mass loss, Thigh (quadriceps)  Fluid Accumulation:  No significant fluid accumulation,      Estimated Daily Nutrient Needs:  Energy (kcal): 1515kcal (30kcal/kg); Weight Used for Energy Requirements: Current  Protein (g): 56g (1.1g/kg); Weight Used for Protein Requirements: Current  Fluid (ml/day): 1515ml; Method Used for Fluid Requirements: 1 ml/kcal    Nutrition Related Findings:  NFPE showing mild wasting. Likes soft foods 2/2 \"bad teeth,\" but denies C/S issues. No n/v/d/c. BM today per pt. No edema. Wounds:    None       Current Nutrition Therapies:  ADULT DIET Regular; 3 carb choices (45 gm/meal);  Low Fat/Low Chol/High Fiber/2 gm Na    Anthropometric Measures:  · Height:  5' 4\" (162.6 cm)  · Current Body Wt:  50.5 kg (111 lb 5.3 oz)   · Usual Body Wt:  59 kg (130 lb) (x2 months ago)     · Ideal Body Wt:  120 lbs:  92.8 %   · BMI Category:  Underweight (BMI less than 22) age over 72       Nutrition Diagnosis:   · Inadequate oral intake related to  (decreased appetite) as evidenced by intake 26-50%    Nutrition Interventions:   Food and/or Nutrient Delivery: Modify current diet, Start oral nutrition supplement  Nutrition Education and Counseling: No recommendations at this time  Coordination of Nutrition Care: Continue to monitor while inpatient    Goals:  Pt to meet >65% of EEN within 5 days. Wt stability +/- 0.5kg. Nutrition Monitoring and Evaluation:   Behavioral-Environmental Outcomes: None identified  Food/Nutrient Intake Outcomes: Food and nutrient intake, Supplement intake  Physical Signs/Symptoms Outcomes: Chewing or swallowing, Meal time behavior, Weight    Discharge Planning:     Too soon to determine     Electronically signed by No Patrick RD on 11/1/2021 at 10:41 AM    Contact: [phone]

## 2021-11-01 NOTE — PROGRESS NOTES
PT arrived to the unit in no acute distress. Assisted to the bed from the stretcher.  O2 at 2LP via NC.

## 2021-11-01 NOTE — PROGRESS NOTES
PT transferred to [facility] room 485. Report given to receiving nurse. Grandson at bedside. Daughter Benitez Vargas notified via telephone of transfer.

## 2021-11-01 NOTE — CONSULTS
Consult    Patient: Yolanda Marmolejo MRN: [id number]  SSN: [id number]    YOB: 1935  Age: 80 y.o. Sex: female      Subjective:      Yolanda Marmolejo is a 80 y.o. female who is being seen for chest pain and heart failure. Patient with history of persistent atrial fibrillation currently in sinus rhythm on sotalol, advanced COPD, diabetes, hypertension and severe distal left main disease and totally occluded RCA supplied by collaterals from the left system. She has joined the rehab program recently and she was getting more short winded and having some chest tightness. She has some nausea without vomiting. She denied recent change in her weight but she has some dependent lower extremity swelling. She underwent cardiac catheterization back in February which showed severe two-vessel disease with a severe distal left main and totally occluded distal RCA. She was evaluated by CT surgeon at [facility] and felt to be too high risk for surgery due to advanced COPD. Anyhow since then she has been dealing with dyspnea with exertion and easy fatigability.       Past Medical History:   Diagnosis Date    Arrhythmia     afib    Chronic obstructive pulmonary disease (HCC)     Diabetes ([address]     Hypertension      Past Surgical History:   Procedure Laterality Date    HX APPENDECTOMY      HX CHOLECYSTECTOMY      HX TONSILLECTOMY        Family History   Problem Relation Age of Onset    Stroke Mother     Heart Disease Father      Social History     Tobacco Use    Smoking status: Never Smoker    Smokeless tobacco: Never Used   Substance Use Topics    Alcohol use: Never      Current Facility-Administered Medications   Medication Dose Route Frequency Provider Last Rate Last Admin    [START ON 11/2/2021] dilTIAZem ER (DILACOR XR) capsule 180 mg  180 mg Oral DAILY Bob Walters MD        sotaloL (BETAPACE) tablet 160 mg  160 mg Oral Q12H Bob Walters MD   160 mg at 11/01/21 1152    insulin lispro (HUMALOG) injection   SubCUTAneous AC&HS Hugo Phoenix, MD        glucose chewable tablet 16 g  4 Tablet Oral PRN Fabrizio Walters MD        dextrose (D50W) injection syrg 12.5-25 g  25-50 mL IntraVENous PRN Fabrizio Walters MD        glucagon (GLUCAGEN) injection 1 mg  1 mg IntraMUSCular PRN Fabrizio Walters MD        acetaminophen (TYLENOL) tablet 650 mg  650 mg Oral Q6H PRN Fabrizio Walters MD        Or   [facility] acetaminophen (TYLENOL) suppository 650 mg  650 mg Rectal Q6H PRN Fabrizio Walters MD        polyethylene glycol (MIRALAX) packet 17 g  17 g Oral DAILY PRN Fabrizio Walters MD        ondansetron (ZOFRAN ODT) tablet 4 mg  4 mg Oral Q8H PRN Fabrizio Walters MD        Or    ondansetron [facility]) injection 4 mg  4 mg IntraVENous Q6H PRN Bob Walters MD   4 mg at 11/01/21 1311    enoxaparin (LOVENOX) injection 40 mg  40 mg SubCUTAneous DAILY Bob Walters MD   40 mg at 11/01/21 1156    aspirin tablet 325 mg  325 mg Oral DAILY Bob Walters MD   325 mg at 11/01/21 1156    atorvastatin (LIPITOR) tablet 40 mg  40 mg Oral QHS Bob Walters MD        cefTRIAXone (ROCEPHIN) 1 g in sterile water (preservative free) 10 mL IV syringe  1 g IntraVENous Q24H Bob Walters MD   1 g at 11/01/21 1152    methylPREDNISolone (PF) (SOLU-MEDROL) injection 40 mg  40 mg IntraVENous Q6H Bob Walters MD   40 mg at 11/01/21 1152    albuterol-ipratropium (DUO-NEB) 2.5 MG-0.5 MG/3 ML  3 mL Nebulization Q6H RT Bob Walters MD   3 mL at 11/01/21 1343    budesonide-formoteroL (SYMBICORT) 160-4.5 mcg/actuation HFA inhaler 2 Puff  2 Puff Inhalation BID Bob Walters MD   2 Puff at 11/01/21 1343    furosemide (LASIX) injection 40 mg  40 mg IntraVENous TID Hugo Phoenix, MD            No Known Allergies    Review of Systems:  A comprehensive review of systems was negative except for that written in the History of Present Illness.     Objective:     Vitals:    10/31/21 2247 10/31/21 2252 11/01/21 0502 11/01/21 0802   BP: (!) 93/57 107/68 131/88 107/66   Pulse: 61 78 81 75   Resp: 20 19 18 20   Temp:    97.4 °F (36.3 °C)   SpO2:  96% 98% 97%   Weight:       Height:            Physical Exam:  General:  Alert, cooperative, no distress, appears stated age. Eyes:  Conjunctivae/corneas clear. PERRL, EOMs intact. Fundi benign   Ears:  Normal TMs and external ear canals both ears. Nose: Nares normal. Septum midline. Mucosa normal. No drainage or sinus tenderness. Mouth/Throat: Lips, mucosa, and tongue normal. Teeth and gums normal.   Neck: Supple, symmetrical, trachea midline, no adenopathy, thyroid: no enlargment/tenderness/nodules, no carotid bruit and no JVD. Back:   Symmetric, no curvature. ROM normal. No CVA tenderness. Lungs:    End expiratory wheezes bilaterally   Heart:  Regular rate and rhythm, S1, S2 normal, no murmur, click, rub or gallop. Abdomen:   Soft, non-tender. Bowel sounds normal. No masses,  No organomegaly. Extremities: Extremities normal, atraumatic, no cyanosis or edema. Pulses: 2+ and symmetric all extremities. Skin: Skin color, texture, turgor normal. No rashes or lesions   Lymph nodes: Cervical, supraclavicular, and axillary nodes normal.   Neurologic: CNII-XII intact. Normal strength, sensation and reflexes throughout. Assessment:     Hospital Problems  Never Reviewed        Codes Class Noted POA    CHF (congestive heart failure) ([facility] ICD-10-CM: I50.9  ICD-9-CM: 428.0  11/1/2021 Unknown        COPD (chronic obstructive pulmonary disease) ([facility] ICD-10-CM: J44.9  ICD-9-CM: 496  10/31/2021 Unknown            Patient is an 68-year-old white female with:  1. Severe coronary artery disease with distal left main disease and a total occluded RCA  2. Heart failure with preserved ejection fraction with decompensation  3. Persistent atrial fibrillation currently on sotalol  4. Hypertensive heart disease  5. Mixed hyperlipidemia  6.   Advanced COPD    Plan: EKG showed atrial fibrillation with slow ventricular response, left anterior fascicular block. There is artifactual baseline. Currently in sinus rhythm on sotalol and Cardizem. Her troponin was 63 upon arrival and is down to 47. We will start heparin gtt. low-dose protocol due to advanced CAD. Her sodium is 128, potassium 4.9. Creatinine 0.83 and BUN of 21. I personally reviewed the cardiac images from February. Patient had totally occluded RCA supplied by collaterals from left circumflex artery. 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 Patient was evaluated by CT surgeon for bypass surgery but she was felt to be high risk due to advanced COPD. Recommendations and plan:  1. Start low-dose heparin protocol  2. Currently on aspirin, atorvastatin, diltiazem and sotalol  3. COPD management per primary team and pulmonary team  4. Obtain echocardiogram  5. Patient is accepted by Dr. Eleanor Bray to [facility] for consideration of high risk PCI of distal left main      Thank you  For involving me in this nice patient's care. I will follow.       Signed By: Emily Vera MD     November 1, 2021

## 2021-11-01 NOTE — ED PROVIDER NOTES
EMERGENCY DEPARTMENT HISTORY AND PHYSICAL EXAM      Date: 10/31/2021  Patient Name: David Carrasquillo      History of Presenting Illness     Chief Complaint   Patient presents with    Shortness of Breath       History Provided By: Patient    HPI: David Carrasquillo, 80 y.o. female with a past medical history significant COPD presents to the ED with cc of creasing shortness of breath over the past 24 hours. She describes some tightness in her chest but also states that it is improved with more oxygen. There are no exacerbating or relieving factors and she has not treated with anything. There are no other complaints, changes, or physical findings at this time. PCP: Jessica Calzada MD    Current Outpatient Medications   Medication Sig Dispense Refill    dilTIAZem ER (DILACOR XR) 120 mg capsule Take 120 mg by mouth daily. Not sure on mg      sotaloL (BETAPACE) 160 mg tablet Take  by mouth. 1/2 morning, 1 at night      vit A/vit C/vit E/zinc/copper (PRESERVISION AREDS PO) Take  by mouth.  fluticasone/umeclidin/vilanter (TRELEGY ELLIPTA IN) Take  by inhalation.  POTASSIUM-99 PO Take  by mouth. OTC      metformin HCl (METFORMIN PO) Take  by mouth. Past History     Past Medical History:  Past Medical History:   Diagnosis Date    Arrhythmia     afib    Chronic obstructive pulmonary disease ([address]     Diabetes [facility]     Hypertension        Past Surgical History:  Past Surgical History:   Procedure Laterality Date    HX APPENDECTOMY      HX CHOLECYSTECTOMY      HX TONSILLECTOMY         Family History:  Family History   Problem Relation Age of Onset    Stroke Mother     Heart Disease Father        Social History:  Social History     Tobacco Use    Smoking status: Never Smoker    Smokeless tobacco: Never Used   Substance Use Topics    Alcohol use: Never    Drug use: Not Currently       Allergies:  No Known Allergies      Review of Systems     Review of Systems   Constitutional: Negative. Negative for appetite change, chills, fatigue and fever. HENT: Negative. Negative for congestion and sinus pain. Eyes: Negative. Negative for pain and visual disturbance. Respiratory: Positive for chest tightness and shortness of breath. Cardiovascular: Negative. Negative for chest pain. Gastrointestinal: Negative. Negative for abdominal pain, diarrhea, nausea and vomiting. Genitourinary: Negative. Negative for difficulty urinating. No discharge   Musculoskeletal: Negative. Negative for arthralgias. Skin: Negative. Negative for rash. Neurological: Negative. Negative for weakness and headaches. Hematological: Negative. Psychiatric/Behavioral: Negative. Negative for agitation. The patient is not nervous/anxious. All other systems reviewed and are negative. Physical Exam     Physical Exam  Vitals and nursing note reviewed. Constitutional:       General: She is not in acute distress. Appearance: She is well-developed. HENT:      Head: Normocephalic and atraumatic. Nose: Nose normal.      Mouth/Throat:      Mouth: Mucous membranes are moist.      Pharynx: Oropharynx is clear. No oropharyngeal exudate. Eyes:      General:         Right eye: No discharge. Left eye: No discharge. Conjunctiva/sclera: Conjunctivae normal.      Pupils: Pupils are equal, round, and reactive to light. Cardiovascular:      Rate and Rhythm: Normal rate and regular rhythm. Chest Wall: PMI is not displaced. No thrill. Heart sounds: Normal heart sounds. No murmur heard. No friction rub. No gallop. Pulmonary:      Effort: Pulmonary effort is normal. Tachypnea present. No respiratory distress. Breath sounds: Normal breath sounds. No wheezing or rales. Chest:      Chest wall: No tenderness. Abdominal:      General: Bowel sounds are normal. There is no distension. Palpations: Abdomen is soft. There is no mass. Tenderness:  There is no abdominal tenderness. There is no guarding or rebound. Musculoskeletal:         General: Normal range of motion. Cervical back: Normal range of motion and neck supple. Lymphadenopathy:      Cervical: No cervical adenopathy. Skin:     General: Skin is warm and dry. Capillary Refill: Capillary refill takes less than 2 seconds. Findings: No erythema or rash. Neurological:      Mental Status: She is alert and oriented to person, place, and time. Cranial Nerves: No cranial nerve deficit. Coordination: Coordination normal.   Psychiatric:         Mood and Affect: Mood normal.         Behavior: Behavior normal.         Lab and Diagnostic Study Results     Labs -     Recent Results (from the past 12 hour(s))   CBC WITH AUTOMATED DIFF    Collection Time: 10/31/21  9:20 PM   Result Value Ref Range    WBC 11.6 (H) 3.6 - 11.0 K/uL    RBC 3.77 (L) 3.80 - 5.20 M/uL    HGB 10.5 (L) 11.5 - 16.0 g/dL    HCT 35.1 35.0 - 47.0 %    MCV 93.1 80.0 - 99.0 FL    MCH 27.9 26.0 - 34.0 PG    MCHC 29.9 (L) 30.0 - 36.5 g/dL    RDW 16.1 (H) 11.5 - 14.5 %    PLATELET 625 293 - 202 K/uL    MPV 9.7 8.9 - 12.9 FL    NRBC 0.0 0.0  WBC    ABSOLUTE NRBC 0.00 0.00 - 0.01 K/uL    NEUTROPHILS 77 (H) 32 - 75 %    LYMPHOCYTES 12 12 - 49 %    MONOCYTES 9 5 - 13 %    EOSINOPHILS 2 0 - 7 %    BASOPHILS 0 0 - 1 %    IMMATURE GRANULOCYTES 0 0 - 0.5 %    ABS. NEUTROPHILS 8.9 (H) 1.8 - 8.0 K/UL    ABS. LYMPHOCYTES 1.3 0.8 - 3.5 K/UL    ABS. MONOCYTES 1.1 (H) 0.0 - 1.0 K/UL    ABS. EOSINOPHILS 0.2 0.0 - 0.4 K/UL    ABS. BASOPHILS 0.0 0.0 - 0.1 K/UL    ABS. IMM.  GRANS. 0.1 (H) 0.00 - 0.04 K/UL    DF AUTOMATED     METABOLIC PANEL, COMPREHENSIVE    Collection Time: 10/31/21  9:20 PM   Result Value Ref Range    Sodium 128 (L) 136 - 145 mmol/L    Potassium 5.5 (H) 3.5 - 5.1 mmol/L    Chloride 86 (L) 97 - 108 mmol/L    CO2 36 (H) 21 - 32 mmol/L    Anion gap 6 5 - 15 mmol/L    Glucose 146 (H) 65 - 100 mg/dL    BUN 22 (H) 6 - 20 mg/dL Creatinine 0.98 0.55 - 1.02 mg/dL    BUN/Creatinine ratio 22 (H) 12 - 20      GFR est AA >60 >60 ml/min/1.73m2    GFR est non-AA 54 (L) >60 ml/min/1.73m2    Calcium 8.6 8.5 - 10.1 mg/dL    Bilirubin, total 0.4 0.2 - 1.0 mg/dL    AST (SGOT) 39 (H) 15 - 37 U/L    ALT (SGPT) 52 12 - 78 U/L    Alk. phosphatase 107 45 - 117 U/L    Protein, total 6.1 (L) 6.4 - 8.2 g/dL    Albumin 2.6 (L) 3.5 - 5.0 g/dL    Globulin 3.5 2.0 - 4.0 g/dL    A-G Ratio 0.7 (L) 1.1 - 2.2     CK W/ REFLX CKMB    Collection Time: 10/31/21  9:20 PM   Result Value Ref Range    CK 37.0 26 - 192 ng/mL   TROPONIN-HIGH SENSITIVITY    Collection Time: 10/31/21  9:20 PM   Result Value Ref Range    Troponin-High Sensitivity 63 (HH) 0 - 51 ng/L   SARS-COV-2    Collection Time: 10/31/21 11:02 PM   Result Value Ref Range    SARS-CoV-2 Please find results under separate order         Radiologic Studies -   [unfilled]  CT Results  (Last 48 hours)    None        CXR Results  (Last 48 hours)               10/31/21 2108  XR CHEST PORT Final result    Impression:  1. Heterogeneous bilateral opacities which may represent pulmonary edema or   multifocal infectious process. 2.  Small left and trace right pleural effusions. Narrative:  Study: Chest radiograph(s), 1 view       Clinical Indication: Shortness of breath. Comparison: None available. Findings: The cardiac silhouette is normal in size. Overlying monitoring leads. Central vascular congestion and heterogeneous perihilar and basilar predominant   opacities. Small left and trace right pleural effusions. No discernible   pneumothorax. Status post kyphoplasty of T11. Medical Decision Making and ED Course   - I am the first and primary provider for this patient AND AM THE PRIMARY PROVIDER OF RECORD. - I reviewed the vital signs, available nursing notes, past medical history, past surgical history, family history and social history.     - Initial assessment performed. The patients presenting problems have been discussed, and the staff are in agreement with the care plan formulated and outlined with them. I have encouraged them to ask questions as they arise throughout their visit. Vital Signs-Reviewed the patient's vital signs. Patient Vitals for the past 12 hrs:   Temp Pulse Resp BP SpO2   10/31/21 2253  78 19 107/68 96 %   10/31/21 2247  61 20 (!) 93/57    10/31/21 2055 97.8 °F (36.6 °C) (!) 58 22 (!) 105/58 100 %       EKG interpretation: (Preliminary): Performed at 2109, and read at 2111  Ventricular rate 65 bpm, MT interval unmeasurable, QRS duration 74 ms,  ms. Interpretation: A. fib with left anterior fascicular block nonspecific ST abnormality. Abnormal EKG. The patient presents with chest tightness and shortness of breath with a differential diagnosis of ACS, arrhythmia, CHF, pneumonia, Covid. ED Course:              Provider Notes (Medical Decision Making):   80-year-old female with history of severe COPD presents with increased shortness of breath. MDM           Consultations:       Consultations: -  Hospitalist Consultant: Dr. Ariana Brooks: We have asked for emergent assistance with regard to this patient. We have discussed the patients HPI, ROS, PE and results this far. They will come and evaluate the patient for admission. Procedures and Critical Care       Performed by: Mei Loza MD  PROCEDURES:  Procedures       Disposition     Disposition: Condition stable    Admitted    Remove if not discharged  DISCHARGE PLAN:  1. Current Discharge Medication List      CONTINUE these medications which have NOT CHANGED    Details   dilTIAZem ER (DILACOR XR) 120 mg capsule Take 120 mg by mouth daily. Not sure on mg      sotaloL (BETAPACE) 160 mg tablet Take  by mouth. 1/2 morning, 1 at night      vit A/vit C/vit E/zinc/copper (PRESERVISION AREDS PO) Take  by mouth.       fluticasone/umeclidin/vilanter ([employer] IN) Take  by inhalation. POTASSIUM-99 PO Take  by mouth. OTC      metformin HCl (METFORMIN PO) Take  by mouth. 2.   Follow-up Information    None       3. Return to ED if worse   4. Current Discharge Medication List          Diagnosis     Clinical Impression:   1. Congestive heart failure, unspecified HF chronicity, unspecified heart failure type [facility])        Attestations:    Jalen Crabtree MD    Please note that this dictation was completed with [employer], the computer voice recognition software. Quite often unanticipated grammatical, syntax, homophones, and other interpretive errors are inadvertently transcribed by the computer software. Please disregard these errors. Please excuse any errors that have escaped final proofreading. Thank you.

## 2021-11-01 NOTE — ED TRIAGE NOTES
Patient called EMS for shortness of breath patient wears 2L NC at home for COPD and was recently discharged for pneumonia. Patient had long oxygen tubing at home EMS described as approx 20 - 30 feet in tubing, they switched her over to their tubing and she improved while en route.

## 2021-11-01 NOTE — ED NOTES
2130- patient moved to trauma room 2 and placed on cardiac monitor and defib pads. Resting with no distress noted. 2300- covid swab walked to lab     0000- family at bedside, covid negative. 56- patient placed on waffle with purewick in place. 0200- patient unable to urinate, straight cathed. 3982- patient up to bedside commode. Patient became short of breathe but maintained sats >92%. Eb- AM labs sent by writer     0283- Report called to Mariana Hartmann on [address] [place] [address] Rd for room 428.  Patient not able to go up to floor until after bed is unblocked (after shift change)

## 2021-11-01 NOTE — H&P
History and Physical    NAME: Amira Strong   :  1935   MRN:  [id number]     Date/Time:  2021 11:09 AM    Patient PCP: Stanford Alberts MD  ______________________________________________________________________             Subjective:     CHIEF COMPLAINT:   Shortness of breath      HISTORY OF PRESENT ILLNESS:       Patient is a 80y.o. year old female signal past medical history of COPD hypertension type 2 diabetes atrial fibrillation not on any anticoagulation came to emergency room complaining of shortness of breath    According to the patient she has some shortness of breath for last 2 to 3 days yesterday getting more worst tightness in the chest came to emergency room seen by the ER physician work-up done shows elevated BNP chest x-ray shows pulmonary edema/infiltrate patient was recommend to be admitted with acute congestive heart failure acute exhibition of COPD and elevated troponin      Past Medical History:   Diagnosis Date    Arrhythmia     afib    [facility] ([facility] [address] [facility]     Diabetes ([facility] [address] [facility]     Hypertension         Past Surgical History:   Procedure Laterality Date    HX APPENDECTOMY      HX CHOLECYSTECTOMY      HX TONSILLECTOMY         Social History     Tobacco Use    Smoking status: Never Smoker    Smokeless tobacco: Never Used   Substance Use Topics    Alcohol use: Never        Family History   Problem Relation Age of Onset    Stroke Mother     Heart Disease Father        No Known Allergies     Prior to Admission medications    Medication Sig Start Date End Date Taking? Authorizing Provider   dilTIAZem ER (DILACOR XR) 120 mg capsule Take 120 mg by mouth daily. Not sure on mg    Provider, Historical   sotaloL (BETAPACE) 160 mg tablet Take  by mouth. 1/2 morning, 1 at night    Provider, Historical   vit A/vit C/vit E/zinc/copper (PRESERVISION AREDS PO) Take  by mouth.     Provider, Historical   fluticasone/umeclidin/vilanter (TRELEGY ELLIPTA IN) Take  by inhalation. Provider, Historical   POTASSIUM-99 PO Take  by mouth. OTC    Provider, Historical   metformin HCl (METFORMIN PO) Take  by mouth.     Provider, Historical         Current Facility-Administered Medications:     [START ON 11/2/2021] dilTIAZem ER (DILACOR XR) capsule 120 mg, 120 mg, Oral, DAILY, Parish Walters MD    sotaloL (BETAPACE) tablet 160 mg, 160 mg, Oral, Q12H, Parish Walters MD    insulin lispro (HUMALOG) injection, , SubCUTAneous, AC&HS, Parish Walters MD    glucose chewable tablet 16 g, 4 Tablet, Oral, PRN, Parish Walters MD    dextrose (D50W) injection syrg 12.5-25 g, 25-50 mL, IntraVENous, PRN, Parish Walters MD    glucagon (GLUCAGEN) injection 1 mg, 1 mg, IntraMUSCular, PRN, Parish Walters MD    acetaminophen (TYLENOL) tablet 650 mg, 650 mg, Oral, Q6H PRN **OR** acetaminophen (TYLENOL) suppository 650 mg, 650 mg, Rectal, Q6H PRN, Parish Walters MD    polyethylene glycol (MIRALAX) packet 17 g, 17 g, Oral, DAILY PRN, Parish Walters MD    ondansetron (ZOFRAN ODT) tablet 4 mg, 4 mg, Oral, Q8H PRN **OR** ondansetron (ZOFRAN) injection 4 mg, 4 mg, IntraVENous, Q6H PRN, Bob Walters MD    enoxaparin (LOVENOX) injection 40 mg, 40 mg, SubCUTAneous, DAILY, Parish Walters MD    aspirin tablet 325 mg, 325 mg, Oral, DAILY, Parish Walters MD    atorvastatin (LIPITOR) tablet 40 mg, 40 mg, Oral, QHS, Bob Walters MD    cefTRIAXone (ROCEPHIN) 1 g in sterile water (preservative free) 10 mL IV syringe, 1 g, IntraVENous, Q24H, Bob Walters MD    methylPREDNISolone (PF) (SOLU-MEDROL) injection 40 mg, 40 mg, IntraVENous, Q6H, Bob Walters MD    albuterol-ipratropium (DUO-NEB) 2.5 MG-0.5 MG/3 ML, 3 mL, Nebulization, Q6H RT, Bob Walters MD    budesonide-formoteroL (SYMBICORT) 160-4.5 mcg/actuation HFA inhaler 2 Puff, 2 Puff, Inhalation, BID, Bob Walters MD    furosemide (LASIX) injection 40 mg, 40 mg, IntraVENous, TID, Parish Walters, MD    LAB DATA REVIEWED:    Recent Results (from the past 24 hour(s))   EKG, 12 LEAD, INITIAL    Collection Time: 10/31/21  9:09 PM   Result Value Ref Range    Ventricular Rate 47 BPM    Atrial Rate 180 BPM    QRS Duration 70 ms    Q-T Interval 438 ms    QTC Calculation (Bezet) 387 ms    Calculated R Axis -53 degrees    Calculated T Axis 83 degrees    Diagnosis       Atrial fibrillation with slow ventricular response with long pause   Left anterior fascicular block  Nonspecific ST and T wave abnormality  Abnormal ECG  When compared with ECG of 31-OCT-2021 21:09, (Unconfirmed)  ST less elevated in Anterior leads  QT has shortened  Confirmed by Bharati Pittman MD, Sam Hubbard ([id number]) on 11/1/2021 8:55:17 AM     CBC WITH AUTOMATED DIFF    Collection Time: 10/31/21  9:20 PM   Result Value Ref Range    WBC 11.6 (H) 3.6 - 11.0 K/uL    RBC 3.77 (L) 3.80 - 5.20 M/uL    HGB 10.5 (L) 11.5 - 16.0 g/dL    HCT 35.1 35.0 - 47.0 %    MCV 93.1 80.0 - 99.0 FL    MCH 27.9 26.0 - 34.0 PG    MCHC 29.9 (L) 30.0 - 36.5 g/dL    RDW 16.1 (H) 11.5 - 14.5 %    PLATELET 924 285 - 507 K/uL    MPV 9.7 8.9 - 12.9 FL    NRBC 0.0 0.0  WBC    ABSOLUTE NRBC 0.00 0.00 - 0.01 K/uL    NEUTROPHILS 77 (H) 32 - 75 %    LYMPHOCYTES 12 12 - 49 %    MONOCYTES 9 5 - 13 %    EOSINOPHILS 2 0 - 7 %    BASOPHILS 0 0 - 1 %    IMMATURE GRANULOCYTES 0 0 - 0.5 %    ABS. NEUTROPHILS 8.9 (H) 1.8 - 8.0 K/UL    ABS. LYMPHOCYTES 1.3 0.8 - 3.5 K/UL    ABS. MONOCYTES 1.1 (H) 0.0 - 1.0 K/UL    ABS. EOSINOPHILS 0.2 0.0 - 0.4 K/UL    ABS. BASOPHILS 0.0 0.0 - 0.1 K/UL    ABS. IMM.  GRANS. 0.1 (H) 0.00 - 0.04 K/UL    DF AUTOMATED     METABOLIC PANEL, COMPREHENSIVE    Collection Time: 10/31/21  9:20 PM   Result Value Ref Range    Sodium 128 (L) 136 - 145 mmol/L    Potassium 5.5 (H) 3.5 - 5.1 mmol/L    Chloride 86 (L) 97 - 108 mmol/L    CO2 36 (H) 21 - 32 mmol/L    Anion gap 6 5 - 15 mmol/L    Glucose 146 (H) 65 - 100 mg/dL    BUN 22 (H) 6 - 20 mg/dL    Creatinine 0.98 0.55 - 1.02 mg/dL BUN/Creatinine ratio 22 (H) 12 - 20      GFR est AA >60 >60 ml/min/1.73m2    GFR est non-AA 54 (L) >60 ml/min/1.73m2    Calcium 8.6 8.5 - 10.1 mg/dL    Bilirubin, total 0.4 0.2 - 1.0 mg/dL    AST (SGOT) 39 (H) 15 - 37 U/L    ALT (SGPT) 52 12 - 78 U/L    Alk.  phosphatase 107 45 - 117 U/L    Protein, total 6.1 (L) 6.4 - 8.2 g/dL    Albumin 2.6 (L) 3.5 - 5.0 g/dL    Globulin 3.5 2.0 - 4.0 g/dL    A-G Ratio 0.7 (L) 1.1 - 2.2     CK W/ REFLX CKMB    Collection Time: 10/31/21  9:20 PM   Result Value Ref Range    CK 37.0 26 - 192 ng/mL   TROPONIN-HIGH SENSITIVITY    Collection Time: 10/31/21  9:20 PM   Result Value Ref Range    Troponin-High Sensitivity 63 (HH) 0 - 51 ng/L   TROPONIN-HIGH SENSITIVITY    Collection Time: 10/31/21 11:00 PM   Result Value Ref Range    Troponin-High Sensitivity 61 (HH) 0 - 51 ng/L   SARS-COV-2    Collection Time: 10/31/21 11:02 PM   Result Value Ref Range    SARS-CoV-2 Please find results under separate order     COVID-19 RAPID TEST    Collection Time: 10/31/21 11:02 PM   Result Value Ref Range    Specimen source Nasopharyngeal      COVID-19 rapid test Not Detected Not Detected     URINALYSIS W/MICROSCOPIC    Collection Time: 11/01/21  2:00 AM   Result Value Ref Range    Color Yellow/Straw      Appearance Clear Clear      Specific gravity <1.005 1.003 - 1.030    pH (UA) 5.0 5.0 - 8.0      Protein Negative Negative mg/dL    Glucose Negative Negative mg/dL    Ketone Negative Negative mg/dL    Bilirubin Negative Negative      Blood Negative Negative      Urobilinogen 0.1 0.1 - 1.0 EU/dL    Nitrites Negative Negative      Leukocyte Esterase Negative Negative      WBC 0-4 0 - 4 /hpf    RBC 0-5 0 - 5 /hpf    Bacteria Negative Negative /hpf    Mucus Negative Negative /lpf    Hyaline cast 2-5 0 - 5 /lpf   TROPONIN-HIGH SENSITIVITY    Collection Time: 11/01/21  5:02 AM   Result Value Ref Range    Troponin-High Sensitivity 47 0 - 51 ng/L   CBC WITH AUTOMATED DIFF    Collection Time: 11/01/21  5:02 AM Result Value Ref Range    WBC 11.5 (H) 3.6 - 11.0 K/uL    RBC 3.64 (L) 3.80 - 5.20 M/uL    HGB 10.2 (L) 11.5 - 16.0 g/dL    HCT 33.5 (L) 35.0 - 47.0 %    MCV 92.0 80.0 - 99.0 FL    MCH 28.0 26.0 - 34.0 PG    MCHC 30.4 30.0 - 36.5 g/dL    RDW 16.0 (H) 11.5 - 14.5 %    PLATELET 192 068 - 251 K/uL    MPV 9.7 8.9 - 12.9 FL    NRBC 0.0 0.0  WBC    ABSOLUTE NRBC 0.00 0.00 - 0.01 K/uL    NEUTROPHILS 78 (H) 32 - 75 %    LYMPHOCYTES 11 (L) 12 - 49 %    MONOCYTES 9 5 - 13 %    EOSINOPHILS 2 0 - 7 %    BASOPHILS 0 0 - 1 %    IMMATURE GRANULOCYTES 0 0 - 0.5 %    ABS. NEUTROPHILS 9.0 (H) 1.8 - 8.0 K/UL    ABS. LYMPHOCYTES 1.2 0.8 - 3.5 K/UL    ABS. MONOCYTES 1.1 (H) 0.0 - 1.0 K/UL    ABS. EOSINOPHILS 0.2 0.0 - 0.4 K/UL    ABS. BASOPHILS 0.0 0.0 - 0.1 K/UL    ABS. IMM. GRANS. 0.1 (H) 0.00 - 0.04 K/UL    DF AUTOMATED     METABOLIC PANEL, BASIC    Collection Time: 11/01/21  5:02 AM   Result Value Ref Range    Sodium 128 (L) 136 - 145 mmol/L    Potassium 4.9 3.5 - 5.1 mmol/L    Chloride 85 (L) 97 - 108 mmol/L    CO2 39 (H) 21 - 32 mmol/L    Anion gap 4 (L) 5 - 15 mmol/L    Glucose 112 (H) 65 - 100 mg/dL    BUN 21 (H) 6 - 20 mg/dL    Creatinine 0.83 0.55 - 1.02 mg/dL    BUN/Creatinine ratio 25 (H) 12 - 20      GFR est AA >60 >60 ml/min/1.73m2    GFR est non-AA >60 >60 ml/min/1.73m2    Calcium 8.6 8.5 - 10.1 mg/dL   NT-PRO BNP    Collection Time: 11/01/21  5:02 AM   Result Value Ref Range    NT pro-BNP 13,938 (H) <450 pg/mL   GLUCOSE, POC    Collection Time: 11/01/21  8:08 AM   Result Value Ref Range    Glucose (POC) 115 65 - 117 mg/dL    Performed by [facility]        XR Results (most recent):  Results from Hospital Encounter encounter on 10/31/21    XR CHEST PORT    Narrative  Study: Chest radiograph(s), 1 view    Clinical Indication: Shortness of breath. Comparison: None available. Findings: The cardiac silhouette is normal in size. Overlying monitoring leads.     Central vascular congestion and heterogeneous perihilar and basilar predominant  opacities. Small left and trace right pleural effusions. No discernible  pneumothorax. Status post kyphoplasty of T11. Impression  1. Heterogeneous bilateral opacities which may represent pulmonary edema or  multifocal infectious process. 2.  Small left and trace right pleural effusions. XR CHEST PORT   Final Result   1. Heterogeneous bilateral opacities which may represent pulmonary edema or   multifocal infectious process. 2.  Small left and trace right pleural effusions. Review of Systems:  Constitutional: Negative for chills and fever. HENT: Negative. Eyes: Negative. Respiratory: Shortness of breath   cardiovascular: Chest pain  Gastrointestinal: Negative for abdominal pain and nausea. Skin: Negative. Neurological: Negative. Objective:   VITALS:    Visit Vitals  /66 (BP 1 Location: Left upper arm, BP Patient Position: At rest;Supine)   Pulse 75   Temp 97.4 °F (36.3 °C)   Resp 20   Ht 5' 4\" (1.626 m)   Wt 49.4 kg (109 lb)   SpO2 97%   BMI 18.71 kg/m²       Physical Exam:   Constitutional: pt is oriented to person, place, and time. HENT:   Head: Normocephalic and atraumatic. Eyes: Pupils are equal, round, and reactive to light. EOM are normal.   Cardiovascular: Normal rate, regular rhythm and normal heart sounds. Pulmonary/Chest: Breath sounds normal. No wheezes. No rales. Exhibits no tenderness. Abdominal: Soft. Bowel sounds are normal. There is no abdominal tenderness. There is no rebound and no guarding. Musculoskeletal: Normal range of motion. Neurological: pt is alert and oriented to person, place, and time. Alert. Normal strength. No cranial nerve deficit or sensory deficit. Displays a negative Romberg sign. ASSESSMENT & PLAN:    Acute congestive heart failure  Acute exhibition of COPD  Chronic A.  Fib  Hypertension  Hyperlipidemia        Current Facility-Administered Medications:     [START ON 11/2/2021] dilTIAZem ER (DILACOR XR) capsule 120 mg, 120 mg, Oral, DAILY, Giulia Walters MD    sotaloL (BETAPACE) tablet 160 mg, 160 mg, Oral, Q12H, Giulia Walters MD    insulin lispro (HUMALOG) injection, , SubCUTAneous, AC&HS, Giulia Walters MD    glucose chewable tablet 16 g, 4 Tablet, Oral, PRN, Giulia Walters MD    dextrose (D50W) injection syrg 12.5-25 g, 25-50 mL, IntraVENous, PRN, Giulia Walters MD    glucagon (GLUCAGEN) injection 1 mg, 1 mg, IntraMUSCular, PRN, Giulia Walters MD    acetaminophen (TYLENOL) tablet 650 mg, 650 mg, Oral, Q6H PRN **OR** acetaminophen (TYLENOL) suppository 650 mg, 650 mg, Rectal, Q6H PRN, Giulia Walters MD    polyethylene glycol (MIRALAX) packet 17 g, 17 g, Oral, DAILY PRN, Giulia Walters MD    ondansetron (ZOFRAN ODT) tablet 4 mg, 4 mg, Oral, Q8H PRN **OR** ondansetron (ZOFRAN) injection 4 mg, 4 mg, IntraVENous, Q6H PRN, Bob Walters MD    enoxaparin (LOVENOX) injection 40 mg, 40 mg, SubCUTAneous, DAILY, Giulia Walters MD    aspirin tablet 325 mg, 325 mg, Oral, DAILY, Giulia Walters MD    atorvastatin (LIPITOR) tablet 40 mg, 40 mg, Oral, QHS, Bob Walters MD    cefTRIAXone (ROCEPHIN) 1 g in sterile water (preservative free) 10 mL IV syringe, 1 g, IntraVENous, Q24H, Bob Walters MD    methylPREDNISolone (PF) (SOLU-MEDROL) injection 40 mg, 40 mg, IntraVENous, Q6H, Bob Walters MD    albuterol-ipratropium (DUO-NEB) 2.5 MG-0.5 MG/3 ML, 3 mL, Nebulization, Q6H RT, Bob Walters MD    budesonide-formoteroL (SYMBICORT) 160-4.5 mcg/actuation HFA inhaler 2 Puff, 2 Puff, Inhalation, BID, Bob Walters MD    furosemide (LASIX) injection 40 mg, 40 mg, IntraVENous, TID, Yari Link MD       Mid to telemetry floor cardiology and pulmonary consult  Start on nebulizer treatment IV Solu-Medrol and Symbicort  Start on IV Lasix aspirin and statin  Repeat BNP and troponin level order 2D complete echo cardiology consult      Continue home medications      Current Facility-Administered Medications:     [START ON 11/2/2021] dilTIAZem ER (DILACOR XR) capsule 180 mg, 180 mg, Oral, DAILY, Margarita Walters MD    sotaloL (BETAPACE) tablet 160 mg, 160 mg, Oral, Q12H, Margarita Walters MD    insulin lispro (HUMALOG) injection, , SubCUTAneous, AC&HS, Margarita Walters MD    glucose chewable tablet 16 g, 4 Tablet, Oral, PRN, Margarita Walters MD    dextrose (D50W) injection syrg 12.5-25 g, 25-50 mL, IntraVENous, PRN, Margarita Walters MD    glucagon (GLUCAGEN) injection 1 mg, 1 mg, IntraMUSCular, PRN, Margarita Walters MD    acetaminophen (TYLENOL) tablet 650 mg, 650 mg, Oral, Q6H PRN **OR** acetaminophen (TYLENOL) suppository 650 mg, 650 mg, Rectal, Q6H PRN, Margarita Walters MD    polyethylene glycol (MIRALAX) packet 17 g, 17 g, Oral, DAILY PRN, Margarita Walters MD    ondansetron (ZOFRAN ODT) tablet 4 mg, 4 mg, Oral, Q8H PRN **OR** ondansetron (ZOFRAN) injection 4 mg, 4 mg, IntraVENous, Q6H PRN, Bob Walters MD    enoxaparin (LOVENOX) injection 40 mg, 40 mg, SubCUTAneous, DAILY, Margarita Walters MD    aspirin tablet 325 mg, 325 mg, Oral, DAILY, Margarita Walters MD    atorvastatin (LIPITOR) tablet 40 mg, 40 mg, Oral, QHS, Bob Walters MD    cefTRIAXone (ROCEPHIN) 1 g in sterile water (preservative free) 10 mL IV syringe, 1 g, IntraVENous, Q24H, Bob Walters MD    methylPREDNISolone (PF) (SOLU-MEDROL) injection 40 mg, 40 mg, IntraVENous, Q6H, Bob Walters MD    albuterol-ipratropium (DUO-NEB) 2.5 MG-0.5 MG/3 ML, 3 mL, Nebulization, Q6H RT, Margarita Walters MD    budesonide-formoteroL (SYMBICORT) 160-4.5 mcg/actuation HFA inhaler 2 Puff, 2 Puff, Inhalation, BID, Bob Walters MD    furosemide (LASIX) injection 40 mg, 40 mg, IntraVENous, TID, Lizbet Puckett MD      ________________________________________________________________________    Signed: Elton Garcia MD

## 2021-11-01 NOTE — PROGRESS NOTES
Reason for Admission:  COPD, CHF                   RUR Score: 10%                    Plan for utilizing home health:          PCP: First and Last name:  Roberto Corrales MD     Name of Practice:    Are you a current patient: Yes/No: Yes   Approximate date of last visit: About 1 month ago   Can you participate in a virtual visit with your PCP:                     Current Advanced Directive/Advance Care Plan: Full Code    Healthcare Decision Maker:   Click here to complete [id number] including selection of the Healthcare Decision Maker Relationship (ie \"Primary\")           Maria Alejandra Marie: [phone]                  Transition of Care Plan: CM met with patient and daughter at bedside to complete assessment. Patient lives with grandson in a tri-level home and takes 3 steps to get to 2nd floor when she enters the home. DME: shower chair, 2L oxygen ([address]), walker (does not use). Patient does not drive, daughter and grandson provide transportation. Independent in ADL's. No HH in the past.    DC plan: home with family. CM will continue to follow.

## 2021-11-02 NOTE — PROGRESS NOTES
Report given to Katya Gonzalez RN at [facility] . Transportation arrived, received verbal approval from Dr. Castro  to sign ENTALA .

## 2021-11-08 LAB
BACTERIA SPEC CULT: NORMAL
BACTERIA SPEC CULT: NORMAL
SPECIAL REQUESTS,SREQ: NORMAL
SPECIAL REQUESTS,SREQ: NORMAL

## 2021-11-13 NOTE — DISCHARGE SUMMARY
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 Patient was evaluated by CT surgeon for bypass surgery but she was felt to be high risk due to advanced COPD.

## 2022-03-19 PROBLEM — J44.9 COPD (CHRONIC OBSTRUCTIVE PULMONARY DISEASE) (HCC): Status: ACTIVE | Noted: 2021-10-31

## 2022-03-19 PROBLEM — I50.9 CHF (CONGESTIVE HEART FAILURE) (HCC): Status: ACTIVE | Noted: 2021-11-01

## 2022-03-19 PROBLEM — R07.9 CHEST PAIN: Status: ACTIVE | Noted: 2021-02-23

## 2023-05-14 RX ORDER — DILTIAZEM HYDROCHLORIDE 120 MG/1
120 CAPSULE, EXTENDED RELEASE ORAL DAILY
COMMUNITY

## 2023-05-14 RX ORDER — SOTALOL HYDROCHLORIDE 160 MG/1
TABLET ORAL
COMMUNITY

## 2023-08-14 NOTE — Clinical Note
Catheter removed. Detail Level: Zone Render Post-Care Instructions In Note?: no Number Of Freeze-Thaw Cycles: 2 freeze-thaw cycles Show Aperture Variable?: Yes Consent: The patient's consent was obtained including but not limited to risks of crusting, scabbing, blistering, scarring, darker or lighter pigmentary change, recurrence, incomplete removal and infection. Duration Of Freeze Thaw-Cycle (Seconds): 3 Post-Care Instructions: I reviewed with the patient in detail post-care instructions. Patient is to wear sunprotection, and avoid picking at any of the treated lesions. Pt may apply Vaseline to crusted or scabbing areas.

## (undated) DEVICE — Device

## (undated) DEVICE — SYRINGE MED 10ML PUR GAM COMPATIBLE POLYCARB FIX M LUER CONN

## (undated) DEVICE — PRESSURE TUBING: Brand: TRUWAVE

## (undated) DEVICE — PERCUTANEOUS ENTRY THINWALL NEEDLE  ONE-PART: Brand: COOK

## (undated) DEVICE — CATHETER GUID AD 5FR L100CM S STL PTFE JL 3.5 LBT

## (undated) DEVICE — CATHETER ANGIO 5FR L100CM GRY S STL NYL JL3.5 3 SEG BRAID L

## (undated) DEVICE — INTRODUCER SHTH 6FR L4CM SHT GRN HUB W/O GWIRE FOR DECLOT

## (undated) DEVICE — BAND COMPR L24CM REG CLR PLAS HEMSTAT EXT HK AND LOOP RETEN

## (undated) DEVICE — LULU RADIAL/FEMORAL ANGIOGRAPHY SHEET: Brand: CONVERTORS

## (undated) DEVICE — COPE MANDRIL WIRE GUIDE STAINLESS STEEL: Brand: COPE

## (undated) DEVICE — GUIDEWIRE VASC L150CM DIA0.035IN FLX END L7CM J 3MM PTFE

## (undated) DEVICE — GUIDEWIRE VASC L185CM DIA0.014IN HYDRPHLC PTFE STR TIP

## (undated) DEVICE — GLIDESHEATH SLENDER NITINOL HYDROPHILIC COATED INTRODUCER SHEATH: Brand: GLIDESHEATH SLENDER

## (undated) DEVICE — COPILOT BLEEDBACK CONTROL VALVE: Brand: COPILOT

## (undated) DEVICE — DEVICE TORQ 0009 0018IN GRN PTFE GWIRE ANGIO COAT PIN VISE

## (undated) DEVICE — WASTEBAG DRIP/ADAPTER: Brand: MEDLINE INDUSTRIES, INC.

## (undated) DEVICE — GUIDEWIRE VASC L260CM DIA0.035IN TIP L3MM STD EXCHG PTFE J

## (undated) DEVICE — Device: Brand: EAGLE EYE PLATINUM ST RX DIGITAL IVUS CATHETER

## (undated) DEVICE — CATHETER ANGIO 5FR L100CM GRY S STL NYL JR4 3 SEG BRAID L

## (undated) DEVICE — SURGICAL PROCEDURE TRAY CRD CATH 3 PRT

## (undated) DEVICE — RUNTHROUGH NS EXTRA FLOPPY PTCA GUIDEWIRE: Brand: RUNTHROUGH

## (undated) DEVICE — TOOL INSRT ANGI GDWIRE MTL SS --